# Patient Record
Sex: FEMALE | URBAN - NONMETROPOLITAN AREA
[De-identification: names, ages, dates, MRNs, and addresses within clinical notes are randomized per-mention and may not be internally consistent; named-entity substitution may affect disease eponyms.]

---

## 2019-11-22 ENCOUNTER — TELEPHONE (OUTPATIENT)
Dept: SURGERY | Facility: CLINIC | Age: 29
End: 2019-11-22

## 2023-05-11 ENCOUNTER — OFFICE VISIT (OUTPATIENT)
Dept: PSYCHIATRY | Facility: CLINIC | Age: 33
End: 2023-05-11
Payer: MEDICAID

## 2023-05-11 VITALS
HEIGHT: 66 IN | BODY MASS INDEX: 29.25 KG/M2 | DIASTOLIC BLOOD PRESSURE: 82 MMHG | WEIGHT: 182 LBS | HEART RATE: 77 BPM | SYSTOLIC BLOOD PRESSURE: 126 MMHG

## 2023-05-11 DIAGNOSIS — F41.1 GENERALIZED ANXIETY DISORDER: ICD-10-CM

## 2023-05-11 DIAGNOSIS — F33.1 MODERATE EPISODE OF RECURRENT MAJOR DEPRESSIVE DISORDER: Primary | ICD-10-CM

## 2023-05-11 DIAGNOSIS — Z13.39 ADHD (ATTENTION DEFICIT HYPERACTIVITY DISORDER) EVALUATION: ICD-10-CM

## 2023-05-11 RX ORDER — BUPROPION HYDROCHLORIDE 100 MG/1
100 TABLET, EXTENDED RELEASE ORAL 2 TIMES DAILY
Qty: 60 TABLET | Refills: 2 | Status: SHIPPED | OUTPATIENT
Start: 2023-05-11

## 2023-05-11 NOTE — PROGRESS NOTES
"Subjective   Marimar Shahid is a 32 y.o. female who presents today for initial evaluation     Chief Complaint:  anxiety    History of Present Illness:   History of Present Illness  Marimar Shahid presents to [facility] for initial evaluation.  Reports that she has been struggling with anxiety/feeling overwhelmed.  Does not feel that she experiences anxiety daily, but does feel constantly overwhelmed every day. Reports symptoms of anxiety that include worry, sometimes on edge and fidgety. She does say that anxiety occurs more at night, often resulting in increased snacking. Says that she is unable to actually rate anxiety as it is not consistent, but rates feeling overwhelmed as 9 out of 10 on a scale of 0-10 with 10 being the worst. Says that she feels that the smallest task can be overwhelming. Manages to do the daily tasks such as wash dishes, but finds organizing her home, doing laundry and other mundane tasks difficult to even start.  Says that she does not feel \"depressed or sad,\" but does admit to feeling disappointed in herself as she cannot complete daily tasks.  Says that she wants to complete these things, but lacks motivation to get them done. Says that she feels that she often has potential, but feels stuck and does not use her time wisely.  Reports appetite is good overall, does admit to a 40 pound weight gain over the past 3 years.  She says that sleep often varies, typically obtains average 10 hours of sleep each night.  She does wake up during the night at times and never feels rested the next day. Tries to make a goal to get up early in the mornings to complete things, but typically goes back to bed.  Says that some days she will get up at 9 AM, other days she will sleep till 12 PM.  Denies any current SI/HI or AVH. PHQ-9 total score: 18, ELIDA-7 total score: 16.    Endorses symptoms of ADHD including, but not limited to: careless mistakes or not " paying attention to directions or people of authority, trouble keeping attention on tasks and during hobbies or leisure activities, does not listen when spoken to directly, trouble organizing activities, avoids dislikes or doesn't want to do things that require mental effort for a long period of time, loses things needed for tasks, easily distracted, forgetful in daily activities, often fidgets with hands or feet or squirms in seat, is often on the go or often acts is if driven by a motor, often talks excessively, often blurts out answers before questions have been finished, often has trouble waiting one's turn and often interrupts or intrudes on others which have caused impairment in important areas of daily functioning.     Past Psychiatric History: Reports that she has struggled with anxiety for as long as she can remember.  Says that she feels that depression was possibly present in high school as she often missed school due to being ill.  Says that she was sick often, at one time developed palpitations and wore a heart monitor.  Says that cardiac issues were ruled out.  Says that now she feels most of her issues were anxiety related.  Denies any inpatient hospitalizations for mental health issues.  Denies any past or current self harming behaviors, SI/HI or AVH.    Previous Psych Meds: No previous psychiatric medications    Substance Use/Abuse: Denies any past or current substance use/abuse    Past Medical/Developmental History: Denies any significant medical history.  Denies any known developmental delays.    Family Psychiatric History: Mother with anxiety, father with depression and anxiety    Social History: Lives in [facility] with her boyfriend of 21 years.  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  She grew up in [place] with both parents until their divorce at age 8.  Has 1 older brother and 1 younger sister.  She says that although  parents , she visited her dad on the weekends. Says that she is close with both parents.  Says that she did well in school academically, often struggled with turning in assignments as she would procrastinate when completing homework.  After high school, she attended college and [employer] to obtain a bachelors in art with a major in ceramics.  Says that she seemed to lose interest and dropped out of college with only 4 classes remaining before obtaining a degree.     The following portions of the patient's history were reviewed and updated as appropriate: allergies, current medications, past family history, past medical history, past social history, past surgical history and problem list.      Past Medical History:  History reviewed. No pertinent past medical history.    Social History:  Social History     Socioeconomic History   • Marital status: Single   Tobacco Use   • Smoking status: Never   Substance and Sexual Activity   • Alcohol use: Yes     Alcohol/week: 4.0 standard drinks     Types: 4 Glasses of wine per week   • Drug use: Never   • Sexual activity: Defer       Family History:  Family History   Problem Relation Age of Onset   • Anxiety disorder Mother    • Anxiety disorder Father    • Depression Sister    • Anxiety disorder Sister    • Depression Brother    • Anxiety disorder Brother        Past Surgical History:  History reviewed. No pertinent surgical history.    Problem List:  There is no problem list on file for this patient.      Allergy:   No Known Allergies     Current Medications:   Current Outpatient Medications   Medication Sig Dispense Refill   • buPROPion SR (Wellbutrin SR) 100 MG 12 hr tablet Take 1 tablet by mouth 2 (Two) Times a Day. 60 tablet 2     No current facility-administered medications for this visit.       Review of Symptoms:    Review of Systems   Constitutional: Negative for activity change, appetite change and unexpected weight loss.   Respiratory: Negative for shortness of  "breath.    Cardiovascular: Negative for chest pain.   Psychiatric/Behavioral: Positive for decreased concentration and dysphoric mood. Negative for sleep disturbance and suicidal ideas. The patient is nervous/anxious.      Physical Exam:   Physical Exam  Vitals reviewed.   Constitutional:       General: She is not in acute distress.     Appearance: Normal appearance.   Neurological:      Mental Status: She is alert.     Vitals:   Blood pressure 126/82, pulse 77, height 166.4 cm (65.5\"), weight 82.6 kg (182 lb).    Mental Status Exam:   Hygiene:   good  Cooperation:  Cooperative  Eye Contact:  Good  Psychomotor Behavior:  Appropriate  Affect:  Appropriate  Mood: normal  Hopelessness: Denies  Speech:  Normal  Thought Process:  Goal directed and Linear  Thought Content:  Mood congruent  Suicidal:  None  Homicidal:  None  Hallucinations:  None  Delusion:  None  Memory:  Intact  Orientation:  Person, Place, Time and Situation  Reliability:  good  Insight:  Good  Judgement:  Good  Impulse Control:  Good    Lab Results:   No visits with results within 6 Month(s) from this visit.   Latest known visit with results is:   No results found for any previous visit.       EKG Results:  No orders to display       Assessment & Plan   Problems Addressed this Visit    None  Visit Diagnoses     Moderate episode of recurrent major depressive disorder    -  Primary    Relevant Medications    buPROPion SR (Wellbutrin SR) 100 MG 12 hr tablet    Generalized anxiety disorder        Relevant Medications    buPROPion SR (Wellbutrin SR) 100 MG 12 hr tablet    ADHD (attention deficit hyperactivity disorder) evaluation          Diagnoses       Codes Comments    Moderate episode of recurrent major depressive disorder    -  Primary ICD-10-CM: F33.1  ICD-9-CM: 296.32     Generalized anxiety disorder     ICD-10-CM: F41.1  ICD-9-CM: 300.02     ADHD (attention deficit hyperactivity disorder) evaluation     ICD-10-CM: Z13.39  ICD-9-CM: V79.8     "       Visit Diagnoses:    ICD-10-CM ICD-9-CM   1. Moderate episode of recurrent major depressive disorder  F33.1 296.32   2. Generalized anxiety disorder  F41.1 300.02   3. ADHD (attention deficit hyperactivity disorder) evaluation  Z13.39 V79.8       Marimar presents today for initial evaluation.  Complains of anxiety and feels that she may struggle with symptoms that strongly aligned with ADHD.  Denies feeling sad or depressed, but does struggle with lack of motivation and decreased energy.  PHQ score of 18 indicating moderately severe depression, ELIDA-7 score of 16 indicating severe anxiety. She also has symptoms that strongly aligned with ADHD and meets DSM-V criteria of ADHD.  Also has positive ASRS.  Discussed plan of care and medication options.  She is agreeable to schedule CPT to further assess ADHD symptoms.  Will start Wellbutrin SR twice daily to help with improvement in overall motivation.    -Start Wellbutrin  mg twice daily    -Reviewed previous available documentation and most recent available labs.   [place] reviewed and is appropriate.     -The benefits of a healthy diet and exercise were discussed with patient, especially the positive effects they have on mental health. Patient encouraged to consider lifestyle modification regarding diet and exercise patterns to maximize results of mental health treatment.     Encouraged patient to practice good sleep hygiene.  Discussed going to bed at the same time and getting up at the same time every day. Consider a quiet activity, such as reading, part of your nighttime routine. Make your bedroom a dark, comfortable place where it is easy to fall asleep. Avoid or limit caffeine consumption. Limit screen use, especially two hours prior to bed (this includes watching TV, using smartphone, tablet or computer).     GOALS:  Short Term Goals: Patient will be compliant with medication, and patient will have no significant medication related side effects.  Patient  will be engaged in psychotherapy as indicated.  Patient will report subjective improvement of symptoms.  Long term goals: To stabilize mood and treat/improve subjective symptoms, the patient will stay out of the hospital, the patient will be at an optimal level of functioning, and the patient will take all medications as prescribed.  The patient/guardian verbalized understanding and agreement with goals that were mutually set.    TREATMENT PLAN: Continue supportive psychotherapy efforts and medications as indicated for patient's diagnosis.  Pharmacological and Non-Pharmacological treatment options discussed during today's visit. Patient/Guardian acknowledged and verbally consented with current treatment plan and was educated on the importance of compliance with treatment and follow-up appointments.      MEDICATION ISSUES:  Discussed medication options and treatment plan of prescribed medication as well as the risks, benefits, any black box warnings, and side effects including potential falls, possible impaired driving, and metabolic adversities among others. Patient is agreeable to call the office with any worsening of symptoms or onset of side effects, or if any concerns or questions arise.  The contact information for the office is made available to the patient. Patient is agreeable to call 911 or go to the nearest ER should they begin having any SI/HI, or if any urgent concerns arise. No medication side effects or related complaints today.     MEDS ORDERED DURING VISIT:  New Medications Ordered This Visit   Medications   • buPROPion SR (Wellbutrin SR) 100 MG 12 hr tablet     Sig: Take 1 tablet by mouth 2 (Two) Times a Day.     Dispense:  60 tablet     Refill:  2       FOLLOW UP:  Return in about 4 weeks (around 6/8/2023) for Recheck.             This document has been electronically signed by RITCHIE Unger  May 11, 2023 12:42 EDT    Please note that portions of this note were completed with a voice  recognition program. Efforts were made to edit dictation, but occasionally words are mistranscribed.

## 2023-05-15 ENCOUNTER — TELEPHONE (OUTPATIENT)
Dept: PSYCHIATRY | Facility: CLINIC | Age: 33
End: 2023-05-15
Payer: MEDICAID

## 2023-05-15 NOTE — TELEPHONE ENCOUNTER
Please let her know that none of her T scores were atypical.  Overall the results do not suggest that Marimar has a disorder characterized by attention deficits, such as ADHD.  We can further discuss these results and symptoms at her follow-up.

## 2023-05-15 NOTE — TELEPHONE ENCOUNTER
Called and read summary of CPT to Marimar. Informed that I could not make a diagnosis and that she would have to wait until her appointment with you to go over everything in detail.

## 2023-05-15 NOTE — TELEPHONE ENCOUNTER
Marimar has been calling asking about her CPT results. She would like to know if someone can call her and let her know what they were.

## 2023-06-09 ENCOUNTER — OFFICE VISIT (OUTPATIENT)
Dept: PSYCHIATRY | Facility: CLINIC | Age: 33
End: 2023-06-09
Payer: MEDICAID

## 2023-06-09 VITALS
HEIGHT: 66 IN | WEIGHT: 182 LBS | BODY MASS INDEX: 29.25 KG/M2 | HEART RATE: 133 BPM | SYSTOLIC BLOOD PRESSURE: 130 MMHG | DIASTOLIC BLOOD PRESSURE: 100 MMHG

## 2023-06-09 DIAGNOSIS — F33.1 MODERATE EPISODE OF RECURRENT MAJOR DEPRESSIVE DISORDER: Primary | ICD-10-CM

## 2023-06-09 DIAGNOSIS — Z79.899 MEDICATION MANAGEMENT: ICD-10-CM

## 2023-06-09 DIAGNOSIS — F41.1 GENERALIZED ANXIETY DISORDER: ICD-10-CM

## 2023-06-19 ENCOUNTER — TELEPHONE (OUTPATIENT)
Dept: PSYCHIATRY | Facility: CLINIC | Age: 33
End: 2023-06-19
Payer: MEDICAID

## 2023-06-19 DIAGNOSIS — Z79.899 MEDICATION MANAGEMENT: Primary | ICD-10-CM

## 2023-06-19 DIAGNOSIS — Z13.39 ADHD (ATTENTION DEFICIT HYPERACTIVITY DISORDER) EVALUATION: ICD-10-CM

## 2023-06-19 NOTE — TELEPHONE ENCOUNTER
Patient called in states Kalie had told her to get a EKG and the order has  can you review I do not see a expiration date on order.

## 2023-06-19 NOTE — TELEPHONE ENCOUNTER
The order is in Epic. It was placed on 2023 and does not  until 2024. She will just need to present to the hospital for it.

## 2023-08-15 ENCOUNTER — OFFICE VISIT (OUTPATIENT)
Dept: PSYCHIATRY | Facility: CLINIC | Age: 33
End: 2023-08-15
Payer: MEDICAID

## 2023-08-15 VITALS
HEIGHT: 66 IN | BODY MASS INDEX: 27.97 KG/M2 | DIASTOLIC BLOOD PRESSURE: 90 MMHG | HEART RATE: 92 BPM | WEIGHT: 174 LBS | SYSTOLIC BLOOD PRESSURE: 152 MMHG

## 2023-08-15 DIAGNOSIS — F41.9 ANXIETY: ICD-10-CM

## 2023-08-15 DIAGNOSIS — F90.2 ADHD (ATTENTION DEFICIT HYPERACTIVITY DISORDER), COMBINED TYPE: Primary | ICD-10-CM

## 2023-08-15 RX ORDER — DEXTROAMPHETAMINE SACCHARATE, AMPHETAMINE ASPARTATE MONOHYDRATE, DEXTROAMPHETAMINE SULFATE AND AMPHETAMINE SULFATE 5; 5; 5; 5 MG/1; MG/1; MG/1; MG/1
20 CAPSULE, EXTENDED RELEASE ORAL EVERY MORNING
Qty: 30 CAPSULE | Refills: 0 | Status: SHIPPED | OUTPATIENT
Start: 2023-08-15

## 2023-08-15 RX ORDER — PROPRANOLOL HYDROCHLORIDE 10 MG/1
10-20 TABLET ORAL 2 TIMES DAILY PRN
Qty: 120 TABLET | Refills: 0 | Status: SHIPPED | OUTPATIENT
Start: 2023-08-15

## 2023-08-15 NOTE — PROGRESS NOTES
Subjective   Marimar Shahid is a 33 y.o. female who presents today for follow up    Chief Complaint: ADHD    History of Present Illness:   History of Present Illness  Marimar Shahid presents today for medication management follow-up.  Currently taking Adderall 10 mg twice daily. Reports only minimal relief of ADHD symptoms with medication. Feels that concentration has improved only slightly and is able to complete tasks without becoming as distracted.  She does continue to struggle with ADHD symptoms that include avoiding tasks that require mental effort, forgetfulness, losing items, fidgetiness and concentrating during conversations.  Rates ADHD symptoms before medication 9/10 and after medication 7-7.5/10 on a 0-10 scale with 10 being the worst. Says that she has been monitoring BP as discussed last visit and brought BP log for review today. Reports situational anxiety and intermittent depressive type symptoms that she feels is related to ADHD symptoms. Denies any adverse effects associated with medication. Denies any CP/SOA or palpitations. Reports that she is sleeping well and appetite is good.  Denies any SI/HI.  PHQ-9 total score: 3 (previously 12), ELIDA-7 total score: 4 (previously 15).    Previous Psych Meds: Wellbutrin SR (not effective), Adderall IR     The following portions of the patient's history were reviewed and updated as appropriate: allergies, current medications, past family history, past medical history, past social history, past surgical history and problem list.      Past Medical History:  History reviewed. No pertinent past medical history.    Social History:  Social History     Socioeconomic History    Marital status: Single   Tobacco Use    Smoking status: Never     Passive exposure: Never    Smokeless tobacco: Never   Vaping Use    Vaping Use: Never used   Substance and Sexual Activity    Alcohol use: Yes     Alcohol/week: 4.0 standard drinks     Types: 4 Glasses of wine per week     "Drug use: Never    Sexual activity: Defer       Family History:  Family History   Problem Relation Age of Onset    Anxiety disorder Mother     Anxiety disorder Father     Depression Sister     Anxiety disorder Sister     Depression Brother     Anxiety disorder Brother        Past Surgical History:  History reviewed. No pertinent surgical history.    Problem List:  There is no problem list on file for this patient.      Allergy:   No Known Allergies     Current Medications:   Current Outpatient Medications   Medication Sig Dispense Refill    amphetamine-dextroamphetamine XR (Adderall XR) 20 MG 24 hr capsule Take 1 capsule by mouth Every Morning 30 capsule 0    propranolol (INDERAL) 10 MG tablet Take 1-2 tablets by mouth 2 (Two) Times a Day As Needed (anxiety). 120 tablet 0     No current facility-administered medications for this visit.     Review of Systems   Constitutional:  Negative for activity change, appetite change, unexpected weight gain and unexpected weight loss.   Respiratory:  Negative for shortness of breath.    Cardiovascular:  Negative for chest pain and palpitations.   Psychiatric/Behavioral:  Positive for decreased concentration and dysphoric mood. Negative for sleep disturbance and suicidal ideas. The patient is nervous/anxious.      Physical Exam  Vitals reviewed.   Constitutional:       General: She is not in acute distress.     Appearance: Normal appearance.   Neurological:      Mental Status: She is alert.      Gait: Gait normal.     Vitals:   Blood pressure 152/90, pulse 92, height 166.4 cm (65.5\"), weight 78.9 kg (174 lb).    Mental Status Exam:   Hygiene:   good  Cooperation:  Cooperative  Eye Contact:  Good  Psychomotor Behavior:  Appropriate  Affect:  Appropriate  Mood: normal  Hopelessness: Denies  Speech:  Normal  Thought Process:  Goal directed and Linear  Thought Content:  Mood congruent  Suicidal:  None  Homicidal:  None  Hallucinations:  None  Delusion:  None  Memory:  " Intact  Orientation:  Person, Place, Time, and Situation  Reliability:  good  Insight:  Good  Judgement:  Good  Impulse Control:  Good    Lab Results:   Hospital Outpatient Visit on 06/19/2023   Component Date Value Ref Range Status    QT Interval 06/19/2023 382  ms Final    QTC Interval 06/19/2023 446  ms Final   Lab on 06/19/2023   Component Date Value Ref Range Status    THC, Screen, Urine 06/19/2023 Negative  Negative Final    Phencyclidine (PCP), Urine 06/19/2023 Negative  Negative Final    Cocaine Screen, Urine 06/19/2023 Negative  Negative Final    Methamphetamine, Ur 06/19/2023 Negative  Negative Final    Opiate Screen 06/19/2023 Negative  Negative Final    Amphetamine Screen, Urine 06/19/2023 Negative  Negative Final    Benzodiazepine Screen, Urine 06/19/2023 Negative  Negative Final    Tricyclic Antidepressants Screen 06/19/2023 Negative  Negative Final    Methadone Screen, Urine 06/19/2023 Negative  Negative Final    Barbiturates Screen, Urine 06/19/2023 Negative  Negative Final    Oxycodone Screen, Urine 06/19/2023 Negative  Negative Final    Propoxyphene Screen 06/19/2023 Negative  Negative Final    Buprenorphine, Screen, Urine 06/19/2023 Negative  Negative Final    PH 06/19/2023 7.9  4.5 - 9 Final    CREATININE 06/19/2023 16.1  20 - 300 mg/dL mg/dL Final    SPECIFIC GRAVITY 06/19/2023 1.001  1.003 - 1.035 Final    CODEINE 06/19/2023 Negative  50 ng/ml ng/ml Final    HYDROCODONE 06/19/2023 Negative  50 ng/ml ng/ml Final    NORHYDROCODONE 06/19/2023 Negative  50 ng/ml ng/ml Final    HYDROMORPHONE 06/19/2023 Negative  50 ng/ml ng/ml Final    MORPHINE 06/19/2023 Negative  50 ng/ml ng/ml Final    FENTANYL 06/19/2023 Negative  2 ng/ml ng/ml Final    NORFENTANYL 06/19/2023 Negative  10 ng/ml ng/ml Final    METHADONE 06/19/2023 Negative  25 ng/ml ng/ml Final    EDDP 06/19/2023 Negative  50 ng/ml ng/ml Final    OXYCODONE 06/19/2023 Negative  50 ng/ml ng/ml Final    NOROXYCODONE 06/19/2023 Negative  50 ng/ml  ng/ml Final    OXYMORPHONE 06/19/2023 Negative  50 ng/ml ng/ml Final    TAPENTADOL 06/19/2023 Negative  50 ng/ml ng/ml Final    TRAMADOL 06/19/2023 Negative  50 ng/ml ng/ml Final    BUPRENORPHINE 06/19/2023 Negative  7.5 ng/ml ng/ml Final    NORBUPRENORPHINE 06/19/2023 Negative  10 ng/ml ng/ml Final    AMOBARBITAL 06/19/2023 Negative  100 ng/ml ng/ml Final    BUTABARBITAL 06/19/2023 Negative  100 ng/ml ng/ml Final    BUTALBITAL 06/19/2023 Negative  100 ng/ml ng/ml Final    PHENOBARBITAL 06/19/2023 Negative  100 ng/ml ng/ml Final    SECOBARBITAL 06/19/2023 Negative  100 ng/ml ng/ml Final    ALPRAZOLAM 06/19/2023 Negative  50 ng/ml ng/ml Final    ALPHA-HYDROXYALPRAZOLAM 06/19/2023 Negative  50 ng/ml ng/ml Final    CLONAZEPAM 06/19/2023 Negative  50 ng/ml ng/ml Final    7- AMINOCLONAZEPAM 06/19/2023 Negative  50 ng/ml ng/ml Final    DIAZEPAM 06/19/2023 Negative  50 ng/ml ng/ml Final    NORDIAZEPAM 06/19/2023 Negative  50 ng/ml ng/ml Final    LORAZEPAM 06/19/2023 Negative  100 ng/ml ng/ml Final    MIDAZOLAM 06/19/2023 Negative  50 ng/ml ng/ml Final    ALPHA-HYDROXYMIDAZOLAM 06/19/2023 Negative  50 ng/ml ng/ml Final    OXAZEPAM 06/19/2023 Negative  50 ng/ml ng/ml Final    TEMAZEPAM 06/19/2023 Negative  50 ng/ml ng/ml Final    ETG 06/19/2023 1162.2 (C)  200 ng/ml ng/ml Final    BENZOYLECGONINE 06/19/2023 Negative  100 ng/ml ng/ml Final    6-CHARIS 06/19/2023 Negative  10 ng/ml ng/ml Final    MDMA 06/19/2023 Negative  100 ng/ml ng/ml Final    PCP 06/19/2023 Negative  20 ng/ml ng/ml Final    DELTA-9-THC 06/19/2023 Negative  20 ng/ml ng/ml Final    AMPHETAMINE 06/19/2023 Negative  250 ng/ml ng/ml Final    METHAMPHETAMINE 06/19/2023 Negative  100 ng/ml ng/ml Final    METHYLPHENIDATE 06/19/2023 Negative  10 ng/ml ng/ml Final    PHENTERMINE 06/19/2023 Negative  100 ng/ml ng/ml Final    RITALINIC ACID 06/19/2023 Negative  50 ng/ml ng/ml Final    CARISOPRODOL 06/19/2023 Negative  100 ng/ml ng/ml Final    GABAPENTIN 06/19/2023  Negative  500 ng/ml ng/ml Final    PREGABALIN 06/19/2023 Negative  250 ng/ml ng/ml Final    ZOLPIDEM 06/19/2023 Negative  2 ng/ml ng/ml Final    CARBOXYZOLPIDEM 06/19/2023 Negative  10 ng/ml ng/ml Final       EKG Results:  No orders to display       Assessment & Plan   Problems Addressed this Visit    None  Visit Diagnoses       ADHD (attention deficit hyperactivity disorder), combined type    -  Primary    Relevant Medications    amphetamine-dextroamphetamine XR (Adderall XR) 20 MG 24 hr capsule    Anxiety        Relevant Medications    propranolol (INDERAL) 10 MG tablet          Diagnoses         Codes Comments    ADHD (attention deficit hyperactivity disorder), combined type    -  Primary ICD-10-CM: F90.2  ICD-9-CM: 314.01     Anxiety     ICD-10-CM: F41.9  ICD-9-CM: 300.00             Visit Diagnoses:    ICD-10-CM ICD-9-CM   1. ADHD (attention deficit hyperactivity disorder), combined type  F90.2 314.01   2. Anxiety  F41.9 300.00     Marimar presents today for medication management follow-up.  Was recently started on Adderall IR 10 mg twice daily for ADHD symptoms and to determine tolerability with plan to switch medication to extended release formulation. Marimar reports that she has done well with medication and denies any adverse effects. She does say that only minimal relief in ADHD symptoms has been provided with medication.  Has monitored BP at home as discussed last visit and has log for review today. SBP ranging from 112-123 and DBP ranging 68-84.  Continues to struggle with situational anxiety and becoming anxious when overwhelmed.  Increased anxiety possibly related to uncontrolled ADHD symptoms. PHQ-9 score of 3 indicating minimal depression, ELIDA-7 score of 4 indicating minimal anxiety. Discussed plan, agreeable to stop Adderall IR 10 mg twice daily and start Adderall XR 20 mg daily. Will also start propranolol 10 to 20 mg twice daily as needed for anxiety. Encouraged her to continue with monitoring BP at  home.    -Stop Adderall IR 10 mg twice daily for ADHD  -Start Adderall XR 20 mg daily for ADHD  -Start propranolol 10 to 20 mg twice daily as needed for anxiety    -Reviewed previous available documentation and most recent available labs. EKG obtained 6/19/2023, NSR with HR 82. UDS on file from 6/19/2023 and is appropriate. [place] reviewed and is appropriate. Signed controlled substance agreement on file. Counseled on use of controlled substances.     The patient is being prescribed a controlled substance as part of the treatment plan. The patient/guardian has been educated of appropriate use of the medication(s), including risks and side effects such as insomnia, headache, exacerbation of tics, nervousness, irritability, overstimulation, tremor, dizziness, anorexia or change in appetite, nausea, dry mouth, constipation, diarrhea, weight loss, sexual dysfunction, psychotic episodes, seizures, palpitations, tachycardia, hypertension, rare activation (activation of hypomania, adrianne, and/or suicidal ideations), cardiovascular adverse effects including sudden death (especially patients with pre-existing structural abnormalities often associated with a family history of cardiac disease), may slow normal growth in children, weight gain is reported but not expected, sedation is possible but activation is much more common, metabolic adversities, and overdose among others. Patient/guardian is also informed that the medication is to be used by the patient only, the medication is to be used only as directed, and the medication should not be combined with other substances unless directed by a Provider/Prescriber. The patient/guardian verbalized understanding and agreement with this in their own words and wish to continue with current treatment plan.     GOALS:  Short Term Goals: Patient will be compliant with medication, and patient will have no significant medication related side effects.  Patient will be engaged in  psychotherapy as indicated.  Patient will report subjective improvement of symptoms.  Long term goals: To stabilize mood and treat/improve subjective symptoms, the patient will stay out of the hospital, the patient will be at an optimal level of functioning, and the patient will take all medications as prescribed.  The patient/guardian verbalized understanding and agreement with goals that were mutually set.    TREATMENT PLAN: Continue supportive psychotherapy efforts and medications as indicated for patient's diagnosis.  Pharmacological and Non-Pharmacological treatment options discussed during today's visit. Patient/Guardian acknowledged and verbally consented with current treatment plan and was educated on the importance of compliance with treatment and follow-up appointments.      MEDICATION ISSUES:  Discussed medication options and treatment plan of prescribed medication as well as the risks, benefits, any black box warnings, and side effects including potential falls, possible impaired driving, and metabolic adversities among others. Patient is agreeable to call the office with any worsening of symptoms or onset of side effects, or if any concerns or questions arise.  The contact information for the office is made available to the patient. Patient is agreeable to call 911 or go to the nearest ER should they begin having any SI/HI, or if any urgent concerns arise. No medication side effects or related complaints today.     MEDS ORDERED DURING VISIT:  New Medications Ordered This Visit   Medications    amphetamine-dextroamphetamine XR (Adderall XR) 20 MG 24 hr capsule     Sig: Take 1 capsule by mouth Every Morning     Dispense:  30 capsule     Refill:  0    propranolol (INDERAL) 10 MG tablet     Sig: Take 1-2 tablets by mouth 2 (Two) Times a Day As Needed (anxiety).     Dispense:  120 tablet     Refill:  0       FOLLOW UP:  Return in about 4 weeks (around 9/12/2023) for Recheck.            This document has been  electronically signed by RITCHIE Unger  August 22, 2023 14:19 EDT    Please note that portions of this note were completed with a voice recognition program. Efforts were made to edit dictation, but occasionally words are mistranscribed.

## 2023-09-14 ENCOUNTER — OFFICE VISIT (OUTPATIENT)
Dept: PSYCHIATRY | Facility: CLINIC | Age: 33
End: 2023-09-14
Payer: MEDICAID

## 2023-09-14 VITALS
DIASTOLIC BLOOD PRESSURE: 98 MMHG | OXYGEN SATURATION: 99 % | HEIGHT: 66 IN | BODY MASS INDEX: 27.97 KG/M2 | WEIGHT: 174 LBS | HEART RATE: 94 BPM | SYSTOLIC BLOOD PRESSURE: 148 MMHG

## 2023-09-14 DIAGNOSIS — F41.9 ANXIETY: ICD-10-CM

## 2023-09-14 DIAGNOSIS — F90.2 ADHD (ATTENTION DEFICIT HYPERACTIVITY DISORDER), COMBINED TYPE: Primary | ICD-10-CM

## 2023-09-14 RX ORDER — DEXTROAMPHETAMINE SACCHARATE, AMPHETAMINE ASPARTATE, DEXTROAMPHETAMINE SULFATE AND AMPHETAMINE SULFATE 2.5; 2.5; 2.5; 2.5 MG/1; MG/1; MG/1; MG/1
10 TABLET ORAL DAILY
Qty: 30 TABLET | Refills: 0 | Status: SHIPPED | OUTPATIENT
Start: 2023-09-14

## 2023-09-14 RX ORDER — DEXTROAMPHETAMINE SACCHARATE, AMPHETAMINE ASPARTATE MONOHYDRATE, DEXTROAMPHETAMINE SULFATE AND AMPHETAMINE SULFATE 5; 5; 5; 5 MG/1; MG/1; MG/1; MG/1
20 CAPSULE, EXTENDED RELEASE ORAL EVERY MORNING
Qty: 30 CAPSULE | Refills: 0 | Status: SHIPPED | OUTPATIENT
Start: 2023-09-14

## 2023-09-14 NOTE — PROGRESS NOTES
Subjective   Marimar Shahid is a 33 y.o. female who presents today for follow up    Chief Complaint: ADHD    History of Present Illness:   History of Present Illness  Marimar Shahid presents today for medication management follow up. Currently taking Adderall XR 20 mg daily. Also has propranolol 10-20 mg twice daily as needed for anxiety. Verbalizes that she has not tried Propranolol yet as her mother and sister become sleepy with medication. Feels that ADHD symptoms are better controlled. Says that she is able to focus, stay on task without becoming distracted and improvement in initiating tasks. Says that medication does tend to wear off in late afternoon, lasting approximately 6.5 hours. Feels that symptoms return around 3 pm. Rates ADHD symptoms 5/10 (previously 7-7.5/10) on a 0-10 scale with 10 being worst. Anxiety remains constant with symptoms that include feeling anxious, nervous, on edge and worry. Symptoms associated with depression occur occasionally, but says that these symptoms are often triggered by elevated anxiety. Reports improvement in sleep, obtaining about 9 hours per night. Says that she is resting better, getting up earlier and not sleeping for long periods of time. Reports appetite is good. Has continued to monitor BP at home with readings ranging from 111/69 to 123/83. Denies any CP/SOA, or palpitations. Denies SI/HI.  PHQ-9 total score: 3, ELIDA-7 total score: 4.    Previous Psych Meds: Wellbutrin SR (not effective), Adderall IR     The following portions of the patient's history were reviewed and updated as appropriate: allergies, current medications, past family history, past medical history, past social history, past surgical history and problem list.      Past Medical History:  History reviewed. No pertinent past medical history.    Social History:  Social History     Socioeconomic History    Marital status: Single   Tobacco Use    Smoking status: Never     Passive exposure: Never  "   Smokeless tobacco: Never   Vaping Use    Vaping Use: Never used   Substance and Sexual Activity    Alcohol use: Yes     Alcohol/week: 4.0 standard drinks     Types: 4 Glasses of wine per week    Drug use: Never    Sexual activity: Defer       Family History:  Family History   Problem Relation Age of Onset    Anxiety disorder Mother     Anxiety disorder Father     Depression Sister     Anxiety disorder Sister     Depression Brother     Anxiety disorder Brother        Past Surgical History:  History reviewed. No pertinent surgical history.    Problem List:  There is no problem list on file for this patient.      Allergy:   No Known Allergies     Current Medications:   Current Outpatient Medications   Medication Sig Dispense Refill    amphetamine-dextroamphetamine XR (Adderall XR) 20 MG 24 hr capsule Take 1 capsule by mouth Every Morning 30 capsule 0    propranolol (INDERAL) 10 MG tablet Take 1-2 tablets by mouth 2 (Two) Times a Day As Needed (anxiety). 120 tablet 0    amphetamine-dextroamphetamine (Adderall) 10 MG tablet Take 1 tablet by mouth Daily. 30 tablet 0     No current facility-administered medications for this visit.     Review of Systems   Constitutional:  Negative for activity change, appetite change, unexpected weight gain and unexpected weight loss.   Respiratory:  Negative for shortness of breath.    Cardiovascular:  Negative for chest pain and palpitations.   Psychiatric/Behavioral:  Positive for decreased concentration and dysphoric mood. Negative for sleep disturbance and suicidal ideas. The patient is nervous/anxious.      Physical Exam  Vitals reviewed.   Constitutional:       General: She is not in acute distress.     Appearance: Normal appearance.   Neurological:      Mental Status: She is alert.      Gait: Gait normal.     Vitals:   Blood pressure 148/98, pulse 94, height 166.4 cm (65.5\"), weight 78.9 kg (174 lb), SpO2 99 %.    Mental Status Exam:   Hygiene:   good  Cooperation:  " Cooperative  Eye Contact:  Good  Psychomotor Behavior:  Appropriate  Affect:  Appropriate  Mood: normal  Hopelessness: Denies  Speech:  Normal  Thought Process:  Goal directed and Linear  Thought Content:  Mood congruent  Suicidal:  None  Homicidal:  None  Hallucinations:  None  Delusion:  None  Memory:  Intact  Orientation:  Person, Place, Time, and Situation  Reliability:  good  Insight:  Good  Judgement:  Good  Impulse Control:  Good    Lab Results:   Hospital Outpatient Visit on 06/19/2023   Component Date Value Ref Range Status    QT Interval 06/19/2023 382  ms Final    QTC Interval 06/19/2023 446  ms Final   Lab on 06/19/2023   Component Date Value Ref Range Status    THC, Screen, Urine 06/19/2023 Negative  Negative Final    Phencyclidine (PCP), Urine 06/19/2023 Negative  Negative Final    Cocaine Screen, Urine 06/19/2023 Negative  Negative Final    Methamphetamine, Ur 06/19/2023 Negative  Negative Final    Opiate Screen 06/19/2023 Negative  Negative Final    Amphetamine Screen, Urine 06/19/2023 Negative  Negative Final    Benzodiazepine Screen, Urine 06/19/2023 Negative  Negative Final    Tricyclic Antidepressants Screen 06/19/2023 Negative  Negative Final    Methadone Screen, Urine 06/19/2023 Negative  Negative Final    Barbiturates Screen, Urine 06/19/2023 Negative  Negative Final    Oxycodone Screen, Urine 06/19/2023 Negative  Negative Final    Propoxyphene Screen 06/19/2023 Negative  Negative Final    Buprenorphine, Screen, Urine 06/19/2023 Negative  Negative Final    PH 06/19/2023 7.9  4.5 - 9 Final    CREATININE 06/19/2023 16.1  20 - 300 mg/dL mg/dL Final    SPECIFIC GRAVITY 06/19/2023 1.001  1.003 - 1.035 Final    CODEINE 06/19/2023 Negative  50 ng/ml ng/ml Final    HYDROCODONE 06/19/2023 Negative  50 ng/ml ng/ml Final    NORHYDROCODONE 06/19/2023 Negative  50 ng/ml ng/ml Final    HYDROMORPHONE 06/19/2023 Negative  50 ng/ml ng/ml Final    MORPHINE 06/19/2023 Negative  50 ng/ml ng/ml Final    FENTANYL  06/19/2023 Negative  2 ng/ml ng/ml Final    NORFENTANYL 06/19/2023 Negative  10 ng/ml ng/ml Final    METHADONE 06/19/2023 Negative  25 ng/ml ng/ml Final    EDDP 06/19/2023 Negative  50 ng/ml ng/ml Final    OXYCODONE 06/19/2023 Negative  50 ng/ml ng/ml Final    NOROXYCODONE 06/19/2023 Negative  50 ng/ml ng/ml Final    OXYMORPHONE 06/19/2023 Negative  50 ng/ml ng/ml Final    TAPENTADOL 06/19/2023 Negative  50 ng/ml ng/ml Final    TRAMADOL 06/19/2023 Negative  50 ng/ml ng/ml Final    BUPRENORPHINE 06/19/2023 Negative  7.5 ng/ml ng/ml Final    NORBUPRENORPHINE 06/19/2023 Negative  10 ng/ml ng/ml Final    AMOBARBITAL 06/19/2023 Negative  100 ng/ml ng/ml Final    BUTABARBITAL 06/19/2023 Negative  100 ng/ml ng/ml Final    BUTALBITAL 06/19/2023 Negative  100 ng/ml ng/ml Final    PHENOBARBITAL 06/19/2023 Negative  100 ng/ml ng/ml Final    SECOBARBITAL 06/19/2023 Negative  100 ng/ml ng/ml Final    ALPRAZOLAM 06/19/2023 Negative  50 ng/ml ng/ml Final    ALPHA-HYDROXYALPRAZOLAM 06/19/2023 Negative  50 ng/ml ng/ml Final    CLONAZEPAM 06/19/2023 Negative  50 ng/ml ng/ml Final    7- AMINOCLONAZEPAM 06/19/2023 Negative  50 ng/ml ng/ml Final    DIAZEPAM 06/19/2023 Negative  50 ng/ml ng/ml Final    NORDIAZEPAM 06/19/2023 Negative  50 ng/ml ng/ml Final    LORAZEPAM 06/19/2023 Negative  100 ng/ml ng/ml Final    MIDAZOLAM 06/19/2023 Negative  50 ng/ml ng/ml Final    ALPHA-HYDROXYMIDAZOLAM 06/19/2023 Negative  50 ng/ml ng/ml Final    OXAZEPAM 06/19/2023 Negative  50 ng/ml ng/ml Final    TEMAZEPAM 06/19/2023 Negative  50 ng/ml ng/ml Final    ETG 06/19/2023 1162.2 (C)  200 ng/ml ng/ml Final    BENZOYLECGONINE 06/19/2023 Negative  100 ng/ml ng/ml Final    6-CHARIS 06/19/2023 Negative  10 ng/ml ng/ml Final    MDMA 06/19/2023 Negative  100 ng/ml ng/ml Final    PCP 06/19/2023 Negative  20 ng/ml ng/ml Final    DELTA-9-THC 06/19/2023 Negative  20 ng/ml ng/ml Final    AMPHETAMINE 06/19/2023 Negative  250 ng/ml ng/ml Final    METHAMPHETAMINE  06/19/2023 Negative  100 ng/ml ng/ml Final    METHYLPHENIDATE 06/19/2023 Negative  10 ng/ml ng/ml Final    PHENTERMINE 06/19/2023 Negative  100 ng/ml ng/ml Final    RITALINIC ACID 06/19/2023 Negative  50 ng/ml ng/ml Final    CARISOPRODOL 06/19/2023 Negative  100 ng/ml ng/ml Final    GABAPENTIN 06/19/2023 Negative  500 ng/ml ng/ml Final    PREGABALIN 06/19/2023 Negative  250 ng/ml ng/ml Final    ZOLPIDEM 06/19/2023 Negative  2 ng/ml ng/ml Final    CARBOXYZOLPIDEM 06/19/2023 Negative  10 ng/ml ng/ml Final       EKG Results:  No orders to display       Assessment & Plan   Problems Addressed this Visit    None  Visit Diagnoses       ADHD (attention deficit hyperactivity disorder), combined type    -  Primary    Relevant Medications    amphetamine-dextroamphetamine (Adderall) 10 MG tablet    amphetamine-dextroamphetamine XR (Adderall XR) 20 MG 24 hr capsule    Anxiety              Diagnoses         Codes Comments    ADHD (attention deficit hyperactivity disorder), combined type    -  Primary ICD-10-CM: F90.2  ICD-9-CM: 314.01     Anxiety     ICD-10-CM: F41.9  ICD-9-CM: 300.00             Visit Diagnoses:    ICD-10-CM ICD-9-CM   1. ADHD (attention deficit hyperactivity disorder), combined type  F90.2 314.01   2. Anxiety  F41.9 300.00     Marimar presents today for medication management follow up. She continues to notice improvement in ADHD symptoms. Feels that current dose of Adderall XR works well, but does feel that medication wears off after about 6.5 hours. Continues to struggle with anxiety, but feels that symptoms are at her baseline. Denies depression, but does feel occasional symptoms of depression that she feels is likely related to anxiety. Discussed plan and medication regimen. Agreeable to continue with Adderall XR 20 mg and will start Adderall IR 10 mg in afternoon to help with symptom control later in the day/evening. Has continued to monitor BP at home with SBP ranging 111-123 and DBP ranging 69-83. She is  agreeable to continue with monitoring BP and keeping log for follow up visit. Has not been utilizing propranolol for anxiety, but voices that she will try medication when anxiety level is elevated. Verbalizes that she does experience some dry mouth with medication, denies any other adverse effects of current medication regimen.     -Start Adderall IR 10 mg daily for ADHD  -Refill Adderall XR 20 mg daily for ADHD  -Continue propranolol 10 to 20 mg twice daily as needed for anxiety    -Reviewed previous available documentation and most recent available labs. EKG obtained 6/19/2023, NSR with HR 82. UDS on file from 6/19/2023 and is appropriate. [place] reviewed per PDMP and is appropriate. Signed controlled substance agreement on file. Counseled on use of controlled substances.     The patient is being prescribed a controlled substance as part of the treatment plan. The patient/guardian has been educated of appropriate use of the medication(s), including risks and side effects such as insomnia, headache, exacerbation of tics, nervousness, irritability, overstimulation, tremor, dizziness, anorexia or change in appetite, nausea, dry mouth, constipation, diarrhea, weight loss, sexual dysfunction, psychotic episodes, seizures, palpitations, tachycardia, hypertension, rare activation (activation of hypomania, adrianne, and/or suicidal ideations), cardiovascular adverse effects including sudden death (especially patients with pre-existing structural abnormalities often associated with a family history of cardiac disease), may slow normal growth in children, weight gain is reported but not expected, sedation is possible but activation is much more common, metabolic adversities, and overdose among others. Patient/guardian is also informed that the medication is to be used by the patient only, the medication is to be used only as directed, and the medication should not be combined with other substances unless directed by a  Provider/Prescriber. The patient/guardian verbalized understanding and agreement with this in their own words and wish to continue with current treatment plan.     GOALS:  Short Term Goals: Patient will be compliant with medication, and patient will have no significant medication related side effects.  Patient will be engaged in psychotherapy as indicated.  Patient will report subjective improvement of symptoms.  Long term goals: To stabilize mood and treat/improve subjective symptoms, the patient will stay out of the hospital, the patient will be at an optimal level of functioning, and the patient will take all medications as prescribed.  The patient/guardian verbalized understanding and agreement with goals that were mutually set.    TREATMENT PLAN: Continue supportive psychotherapy efforts and medications as indicated for patient's diagnosis.  Pharmacological and Non-Pharmacological treatment options discussed during today's visit. Patient/Guardian acknowledged and verbally consented with current treatment plan and was educated on the importance of compliance with treatment and follow-up appointments.      MEDICATION ISSUES:  Discussed medication options and treatment plan of prescribed medication as well as the risks, benefits, any black box warnings, and side effects including potential falls, possible impaired driving, and metabolic adversities among others. Patient is agreeable to call the office with any worsening of symptoms or onset of side effects, or if any concerns or questions arise.  The contact information for the office is made available to the patient. Patient is agreeable to call 911 or go to the nearest ER should they begin having any SI/HI, or if any urgent concerns arise. No medication side effects or related complaints today.     MEDS ORDERED DURING VISIT:  New Medications Ordered This Visit   Medications    amphetamine-dextroamphetamine (Adderall) 10 MG tablet     Sig: Take 1 tablet by mouth  Daily.     Dispense:  30 tablet     Refill:  0    amphetamine-dextroamphetamine XR (Adderall XR) 20 MG 24 hr capsule     Sig: Take 1 capsule by mouth Every Morning     Dispense:  30 capsule     Refill:  0       FOLLOW UP:  Return in about 4 weeks (around 10/12/2023) for Recheck.    I spent 30 minutes caring for Marimar on this date of service. This time includes time spent by me in the following activities: preparing for the visit, obtaining and/or reviewing a separately obtained history, performing a medically appropriate examination and/or evaluation, counseling and educating the patient/family/caregiver, ordering medications, tests, or procedures and documenting information in the medical record.           This document has been electronically signed by RITCHIE Unger  September 14, 2023 14:00 EDT    Please note that portions of this note were completed with a voice recognition program. Efforts were made to edit dictation, but occasionally words are mistranscribed.

## 2023-10-24 DIAGNOSIS — F41.9 ANXIETY: ICD-10-CM

## 2023-10-24 DIAGNOSIS — F90.2 ADHD (ATTENTION DEFICIT HYPERACTIVITY DISORDER), COMBINED TYPE: ICD-10-CM

## 2023-10-24 RX ORDER — PROPRANOLOL HYDROCHLORIDE 20 MG/1
10-20 TABLET ORAL 2 TIMES DAILY PRN
Qty: 60 TABLET | Refills: 2 | Status: SHIPPED | OUTPATIENT
Start: 2023-10-24

## 2023-10-24 RX ORDER — DEXTROAMPHETAMINE SACCHARATE, AMPHETAMINE ASPARTATE, DEXTROAMPHETAMINE SULFATE AND AMPHETAMINE SULFATE 2.5; 2.5; 2.5; 2.5 MG/1; MG/1; MG/1; MG/1
10 TABLET ORAL DAILY
Qty: 30 TABLET | Refills: 0 | Status: SHIPPED | OUTPATIENT
Start: 2023-10-24

## 2023-10-24 RX ORDER — DEXTROAMPHETAMINE SACCHARATE, AMPHETAMINE ASPARTATE MONOHYDRATE, DEXTROAMPHETAMINE SULFATE AND AMPHETAMINE SULFATE 5; 5; 5; 5 MG/1; MG/1; MG/1; MG/1
20 CAPSULE, EXTENDED RELEASE ORAL EVERY MORNING
Qty: 30 CAPSULE | Refills: 0 | Status: SHIPPED | OUTPATIENT
Start: 2023-10-24

## 2023-10-24 NOTE — TELEPHONE ENCOUNTER
Rx Refill Note  Requested Prescriptions     Pending Prescriptions Disp Refills    amphetamine-dextroamphetamine XR (Adderall XR) 20 MG 24 hr capsule 30 capsule 0     Sig: Take 1 capsule by mouth Every Morning    amphetamine-dextroamphetamine (Adderall) 10 MG tablet 30 tablet 0     Sig: Take 1 tablet by mouth Daily.    propranolol (INDERAL) 10 MG tablet 120 tablet 0     Sig: Take 1-2 tablets by mouth 2 (Two) Times a Day As Needed (anxiety).      Last office visit with prescribing clinician: 9/14/2023   Last telemedicine visit with prescribing clinician: Visit date not found   Next office visit with prescribing clinician: 11/28/2023                         Would you like a call back once the refill request has been completed: [] Yes [] No    If the office needs to give you a call back, can they leave a voicemail: [] Yes [] No    Michael Kearns MA  10/24/23, 13:49 EDT

## 2023-11-28 ENCOUNTER — TELEMEDICINE (OUTPATIENT)
Dept: PSYCHIATRY | Facility: CLINIC | Age: 33
End: 2023-11-28
Payer: MEDICAID

## 2023-11-28 DIAGNOSIS — F33.8 SEASONAL DEPRESSION: ICD-10-CM

## 2023-11-28 DIAGNOSIS — F41.1 GENERALIZED ANXIETY DISORDER: ICD-10-CM

## 2023-11-28 DIAGNOSIS — F90.2 ADHD (ATTENTION DEFICIT HYPERACTIVITY DISORDER), COMBINED TYPE: Primary | ICD-10-CM

## 2023-11-28 PROCEDURE — 1160F RVW MEDS BY RX/DR IN RCRD: CPT | Performed by: NURSE PRACTITIONER

## 2023-11-28 PROCEDURE — 1159F MED LIST DOCD IN RCRD: CPT | Performed by: NURSE PRACTITIONER

## 2023-11-28 PROCEDURE — 99214 OFFICE O/P EST MOD 30 MIN: CPT | Performed by: NURSE PRACTITIONER

## 2023-11-28 RX ORDER — BUPROPION HYDROCHLORIDE 150 MG/1
150 TABLET ORAL EVERY MORNING
Qty: 30 TABLET | Refills: 1 | Status: SHIPPED | OUTPATIENT
Start: 2023-11-28 | End: 2023-12-11

## 2023-11-28 RX ORDER — DEXTROAMPHETAMINE SACCHARATE, AMPHETAMINE ASPARTATE MONOHYDRATE, DEXTROAMPHETAMINE SULFATE AND AMPHETAMINE SULFATE 5; 5; 5; 5 MG/1; MG/1; MG/1; MG/1
20 CAPSULE, EXTENDED RELEASE ORAL EVERY MORNING
Qty: 30 CAPSULE | Refills: 0 | Status: SHIPPED | OUTPATIENT
Start: 2023-11-28

## 2023-11-28 RX ORDER — DEXTROAMPHETAMINE SACCHARATE, AMPHETAMINE ASPARTATE, DEXTROAMPHETAMINE SULFATE AND AMPHETAMINE SULFATE 2.5; 2.5; 2.5; 2.5 MG/1; MG/1; MG/1; MG/1
10 TABLET ORAL DAILY
Qty: 30 TABLET | Refills: 0 | Status: SHIPPED | OUTPATIENT
Start: 2023-11-28

## 2023-11-28 NOTE — PROGRESS NOTES
This provider is located at [facility], Behavioral Health, [address], [address] [facility] [address] [facility] in [place] [address], using a secure [employer] Video Visit through [employer]. Patient is being seen remotely via telehealth at their home address in [place] [address], and stated they are in a secure environment for this session. The patient's condition being diagnosed/treated is appropriate for telemedicine. The provider identified herself as well as her credentials. The patient, and/or patients guardian, consent to be seen remotely, and when consent is given they understand that the consent allows for patient identifiable information to be sent to a third party as needed. They may refuse to be seen remotely at any time. The electronic data is encrypted and password protected, and the patient and/or guardian has been advised of the potential risks to privacy not withstanding such measures.     You have chosen to receive care through a telehealth visit.  Do you consent to use a video/audio connection for your medical care today? Yes    Subjective   Marimar Shahid is a 33 y.o. female who presents today for follow up    Chief Complaint: ADHD    History of Present Illness:   History of Present Illness  Marimar Shahid presents today for medication management follow-up via MyChart video visit.  Currently taking Adderall XR 20 mg daily, Adderall IR 10 mg daily and propranolol 10 to 20 mg twice daily as needed.  Voices increase in depressive type symptoms, that she feels is more related to seasonal change.  Feels that winter is always trigger of sadness, decreased energy and decreased motivation.  Rates current depression 5/10 on a 0-10 scale with 10 being the worst.  Anxiety varies, often dependent on the day.  Sleep has improved, typically obtains between 8 to 9 hours per night.  Recently got a puppy.  Denies any SI/HI or AVH.  PHQ-9 total score: 3, ELIDA-7 total score: 6.    Previous Psych Meds: Wellbutrin SR  (not effective), Adderall IR     The following portions of the patient's history were reviewed and updated as appropriate: allergies, current medications, past family history, past medical history, past social history, past surgical history and problem list.      Past Medical History:  History reviewed. No pertinent past medical history.    Social History:  Social History     Socioeconomic History    Marital status: Single   Tobacco Use    Smoking status: Never     Passive exposure: Never    Smokeless tobacco: Never   Vaping Use    Vaping Use: Never used   Substance and Sexual Activity    Alcohol use: Yes     Alcohol/week: 4.0 standard drinks of alcohol     Types: 4 Glasses of wine per week    Drug use: Never    Sexual activity: Defer       Family History:  Family History   Problem Relation Age of Onset    Anxiety disorder Mother     Anxiety disorder Father     Depression Sister     Anxiety disorder Sister     Depression Brother     Anxiety disorder Brother        Past Surgical History:  History reviewed. No pertinent surgical history.    Problem List:  There is no problem list on file for this patient.      Allergy:   Allergies   Allergen Reactions    Wellbutrin [Bupropion] Hives        Current Medications:   Current Outpatient Medications   Medication Sig Dispense Refill    amphetamine-dextroamphetamine (Adderall) 10 MG tablet Take 1 tablet by mouth Daily. 30 tablet 0    amphetamine-dextroamphetamine XR (Adderall XR) 20 MG 24 hr capsule Take 1 capsule by mouth Every Morning 30 capsule 0    propranolol (INDERAL) 20 MG tablet Take 0.5-1 tablets by mouth 2 (Two) Times a Day As Needed (anxiety). 60 tablet 2     No current facility-administered medications for this visit.     Review of Systems   Constitutional:  Negative for activity change, appetite change, unexpected weight gain and unexpected weight loss.   Respiratory:  Negative for shortness of breath.    Cardiovascular:  Negative for chest pain and palpitations.    Psychiatric/Behavioral:  Positive for decreased concentration and dysphoric mood. Negative for sleep disturbance and suicidal ideas. The patient is nervous/anxious.      Physical Exam  Constitutional:       General: She is not in acute distress.     Appearance: Normal appearance.   Neurological:      Mental Status: She is alert.     Vitals:   The patient was seen remotely today via a MyChart Video Visit through [facility]. Unable to obtain vital signs due to nature of remote visit.    Mental Status Exam:   Hygiene:    appears good  Cooperation:  Cooperative  Eye Contact:   LAVELL r/t video visit  Psychomotor Behavior:  Appropriate  Affect:  Appropriate  Mood: normal  Hopelessness: Denies  Speech:  Normal  Thought Process:  Goal directed and Linear  Thought Content:  Mood congruent  Suicidal:  None  Homicidal:  None  Hallucinations:  None  Delusion:  None  Memory:  Intact  Orientation:  Person, Place, Time, and Situation  Reliability:  good  Insight:  Good  Judgement:  Good  Impulse Control:  Good    Lab Results:   Hospital Outpatient Visit on 06/19/2023   Component Date Value Ref Range Status    QT Interval 06/19/2023 382  ms Final    QTC Interval 06/19/2023 446  ms Final   Lab on 06/19/2023   Component Date Value Ref Range Status    THC, Screen, Urine 06/19/2023 Negative  Negative Final    Phencyclidine (PCP), Urine 06/19/2023 Negative  Negative Final    Cocaine Screen, Urine 06/19/2023 Negative  Negative Final    Methamphetamine, Ur 06/19/2023 Negative  Negative Final    Opiate Screen 06/19/2023 Negative  Negative Final    Amphetamine Screen, Urine 06/19/2023 Negative  Negative Final    Benzodiazepine Screen, Urine 06/19/2023 Negative  Negative Final    Tricyclic Antidepressants Screen 06/19/2023 Negative  Negative Final    Methadone Screen, Urine 06/19/2023 Negative  Negative Final    Barbiturates Screen, Urine 06/19/2023 Negative  Negative Final    Oxycodone Screen, Urine 06/19/2023 Negative  Negative Final     Propoxyphene Screen 06/19/2023 Negative  Negative Final    Buprenorphine, Screen, Urine 06/19/2023 Negative  Negative Final    PH 06/19/2023 7.9  4.5 - 9 Final    CREATININE 06/19/2023 16.1  20 - 300 mg/dL mg/dL Final    SPECIFIC GRAVITY 06/19/2023 1.001  1.003 - 1.035 Final    CODEINE 06/19/2023 Negative  50 ng/ml ng/ml Final    HYDROCODONE 06/19/2023 Negative  50 ng/ml ng/ml Final    NORHYDROCODONE 06/19/2023 Negative  50 ng/ml ng/ml Final    HYDROMORPHONE 06/19/2023 Negative  50 ng/ml ng/ml Final    MORPHINE 06/19/2023 Negative  50 ng/ml ng/ml Final    FENTANYL 06/19/2023 Negative  2 ng/ml ng/ml Final    NORFENTANYL 06/19/2023 Negative  10 ng/ml ng/ml Final    METHADONE 06/19/2023 Negative  25 ng/ml ng/ml Final    EDDP 06/19/2023 Negative  50 ng/ml ng/ml Final    OXYCODONE 06/19/2023 Negative  50 ng/ml ng/ml Final    NOROXYCODONE 06/19/2023 Negative  50 ng/ml ng/ml Final    OXYMORPHONE 06/19/2023 Negative  50 ng/ml ng/ml Final    TAPENTADOL 06/19/2023 Negative  50 ng/ml ng/ml Final    TRAMADOL 06/19/2023 Negative  50 ng/ml ng/ml Final    BUPRENORPHINE 06/19/2023 Negative  7.5 ng/ml ng/ml Final    NORBUPRENORPHINE 06/19/2023 Negative  10 ng/ml ng/ml Final    AMOBARBITAL 06/19/2023 Negative  100 ng/ml ng/ml Final    BUTABARBITAL 06/19/2023 Negative  100 ng/ml ng/ml Final    BUTALBITAL 06/19/2023 Negative  100 ng/ml ng/ml Final    PHENOBARBITAL 06/19/2023 Negative  100 ng/ml ng/ml Final    SECOBARBITAL 06/19/2023 Negative  100 ng/ml ng/ml Final    ALPRAZOLAM 06/19/2023 Negative  50 ng/ml ng/ml Final    ALPHA-HYDROXYALPRAZOLAM 06/19/2023 Negative  50 ng/ml ng/ml Final    CLONAZEPAM 06/19/2023 Negative  50 ng/ml ng/ml Final    7- AMINOCLONAZEPAM 06/19/2023 Negative  50 ng/ml ng/ml Final    DIAZEPAM 06/19/2023 Negative  50 ng/ml ng/ml Final    NORDIAZEPAM 06/19/2023 Negative  50 ng/ml ng/ml Final    LORAZEPAM 06/19/2023 Negative  100 ng/ml ng/ml Final    MIDAZOLAM 06/19/2023 Negative  50 ng/ml ng/ml Final     ALPHA-HYDROXYMIDAZOLAM 06/19/2023 Negative  50 ng/ml ng/ml Final    OXAZEPAM 06/19/2023 Negative  50 ng/ml ng/ml Final    TEMAZEPAM 06/19/2023 Negative  50 ng/ml ng/ml Final    ETG 06/19/2023 1162.2 (C)  200 ng/ml ng/ml Final    BENZOYLECGONINE 06/19/2023 Negative  100 ng/ml ng/ml Final    6-CHARIS 06/19/2023 Negative  10 ng/ml ng/ml Final    MDMA 06/19/2023 Negative  100 ng/ml ng/ml Final    PCP 06/19/2023 Negative  20 ng/ml ng/ml Final    DELTA-9-THC 06/19/2023 Negative  20 ng/ml ng/ml Final    AMPHETAMINE 06/19/2023 Negative  250 ng/ml ng/ml Final    METHAMPHETAMINE 06/19/2023 Negative  100 ng/ml ng/ml Final    METHYLPHENIDATE 06/19/2023 Negative  10 ng/ml ng/ml Final    PHENTERMINE 06/19/2023 Negative  100 ng/ml ng/ml Final    RITALINIC ACID 06/19/2023 Negative  50 ng/ml ng/ml Final    CARISOPRODOL 06/19/2023 Negative  100 ng/ml ng/ml Final    GABAPENTIN 06/19/2023 Negative  500 ng/ml ng/ml Final    PREGABALIN 06/19/2023 Negative  250 ng/ml ng/ml Final    ZOLPIDEM 06/19/2023 Negative  2 ng/ml ng/ml Final    CARBOXYZOLPIDEM 06/19/2023 Negative  10 ng/ml ng/ml Final       EKG Results:  No orders to display       Assessment & Plan   Problems Addressed this Visit    None  Visit Diagnoses       ADHD (attention deficit hyperactivity disorder), combined type    -  Primary    Relevant Medications    amphetamine-dextroamphetamine XR (Adderall XR) 20 MG 24 hr capsule    amphetamine-dextroamphetamine (Adderall) 10 MG tablet    Seasonal depression        Relevant Medications    amphetamine-dextroamphetamine XR (Adderall XR) 20 MG 24 hr capsule    amphetamine-dextroamphetamine (Adderall) 10 MG tablet    Generalized anxiety disorder        Relevant Medications    amphetamine-dextroamphetamine XR (Adderall XR) 20 MG 24 hr capsule    amphetamine-dextroamphetamine (Adderall) 10 MG tablet          Diagnoses         Codes Comments    ADHD (attention deficit hyperactivity disorder), combined type    -  Primary ICD-10-CM:  F90.2  ICD-9-CM: 314.01     Seasonal depression     ICD-10-CM: F33.8  ICD-9-CM: 296.99     Generalized anxiety disorder     ICD-10-CM: F41.1  ICD-9-CM: 300.02             Visit Diagnoses:    ICD-10-CM ICD-9-CM   1. ADHD (attention deficit hyperactivity disorder), combined type  F90.2 314.01   2. Seasonal depression  F33.8 296.99   3. Generalized anxiety disorder  F41.1 300.02     Marimar presents today via [employer] video visit for medication management follow-up.  Reports overall improvement in ADHD symptoms with current medication regimen.  Currently struggling with more seasonal type depression and intermittent anxiety.  Discussed medication options/regimen, agreeable to continue with Adderall XR 20 mg daily, Adderall IR 10 mg daily, propranolol 10 to 20 mg twice daily as needed and add Wellbutrin  mg daily for depression.  Denies any adverse effects of current medication regimen.    -Start Wellbutrin  mg daily  -Refill Adderall IR 10 mg daily for ADHD  -Refill Adderall XR 20 mg daily for ADHD  -Continue propranolol 10 to 20 mg twice daily as needed for anxiety    -Reviewed previous available documentation and most recent available labs. EKG obtained 6/19/2023, NSR with HR 82. UDS on file from 6/19/2023 and is appropriate. [place] reviewed per PDMP and is appropriate. Signed controlled substance agreement on file. Counseled on use of controlled substances.     GOALS:  Short Term Goals: Patient will be compliant with medication, and patient will have no significant medication related side effects.  Patient will be engaged in psychotherapy as indicated.  Patient will report subjective improvement of symptoms.  Long term goals: To stabilize mood and treat/improve subjective symptoms, the patient will stay out of the hospital, the patient will be at an optimal level of functioning, and the patient will take all medications as prescribed.  The patient/guardian verbalized understanding and agreement with goals  that were mutually set.    TREATMENT PLAN: Continue supportive psychotherapy efforts and medications as indicated for patient's diagnosis.  Pharmacological and Non-Pharmacological treatment options discussed during today's visit. Patient/Guardian acknowledged and verbally consented with current treatment plan and was educated on the importance of compliance with treatment and follow-up appointments.      MEDICATION ISSUES:  Discussed medication options and treatment plan of prescribed medication as well as the risks, benefits, any black box warnings, and side effects including potential falls, possible impaired driving, and metabolic adversities among others. Patient is agreeable to call the office with any worsening of symptoms or onset of side effects, or if any concerns or questions arise.  The contact information for the office is made available to the patient. Patient is agreeable to call 911 or go to the nearest ER should they begin having any SI/HI, or if any urgent concerns arise. No medication side effects or related complaints today.     MEDS ORDERED DURING VISIT:  New Medications Ordered This Visit   Medications    amphetamine-dextroamphetamine XR (Adderall XR) 20 MG 24 hr capsule     Sig: Take 1 capsule by mouth Every Morning     Dispense:  30 capsule     Refill:  0    amphetamine-dextroamphetamine (Adderall) 10 MG tablet     Sig: Take 1 tablet by mouth Daily.     Dispense:  30 tablet     Refill:  0       FOLLOW UP:  Return in about 8 weeks (around 1/23/2024) for Recheck, Video visit.    I spent 25 minutes caring for Marimar on this date of service. This time includes time spent by me in the following activities: preparing for the visit, obtaining and/or reviewing a separately obtained history, performing a medically appropriate examination and/or evaluation, counseling and educating the patient/family/caregiver, ordering medications, tests, or procedures and documenting information in the medical record.            This document has been electronically signed by RITCHIE Unger  December 12, 2023 22:30 EST    Please note that portions of this note were completed with a voice recognition program. Efforts were made to edit dictation, but occasionally words are mistranscribed.

## 2023-12-11 ENCOUNTER — TELEPHONE (OUTPATIENT)
Dept: PSYCHIATRY | Facility: CLINIC | Age: 33
End: 2023-12-11
Payer: MEDICAID

## 2023-12-11 NOTE — TELEPHONE ENCOUNTER
Can try a different medication. She can take OTC Benadryl as directed if hives/itching is has not improved.

## 2023-12-11 NOTE — TELEPHONE ENCOUNTER
Patient left a message that she had a reaction to Wellbutrin it caused her to have hives. Started Friday on 10 th day thinks her scalp started itching for a few days before hives. Progressively got worst hives popped up on chin, corners of mouth, on top of back feet and palms was really itchy. Like travelled all over. Was itchy Saturday, Sunday worn off today a lot better. She said it was helping and so is upset that it caused this. She said that she has stopped the medication but wanted to ask Kalie for advisement. I did list as allergy on her med list.

## 2024-01-23 ENCOUNTER — TELEMEDICINE (OUTPATIENT)
Dept: PSYCHIATRY | Facility: CLINIC | Age: 34
End: 2024-01-23
Payer: MEDICAID

## 2024-01-23 DIAGNOSIS — F41.1 GENERALIZED ANXIETY DISORDER: ICD-10-CM

## 2024-01-23 DIAGNOSIS — F90.2 ADHD (ATTENTION DEFICIT HYPERACTIVITY DISORDER), COMBINED TYPE: Primary | ICD-10-CM

## 2024-01-23 PROCEDURE — 1160F RVW MEDS BY RX/DR IN RCRD: CPT | Performed by: NURSE PRACTITIONER

## 2024-01-23 PROCEDURE — 99214 OFFICE O/P EST MOD 30 MIN: CPT | Performed by: NURSE PRACTITIONER

## 2024-01-23 PROCEDURE — 1159F MED LIST DOCD IN RCRD: CPT | Performed by: NURSE PRACTITIONER

## 2024-01-23 RX ORDER — DEXTROAMPHETAMINE SACCHARATE, AMPHETAMINE ASPARTATE, DEXTROAMPHETAMINE SULFATE AND AMPHETAMINE SULFATE 2.5; 2.5; 2.5; 2.5 MG/1; MG/1; MG/1; MG/1
10 TABLET ORAL 2 TIMES DAILY
Qty: 60 TABLET | Refills: 0 | Status: SHIPPED | OUTPATIENT
Start: 2024-01-23

## 2024-01-23 NOTE — PROGRESS NOTES
This provider is located at [facility], [facility]al [facility], [address], [address] in [place] [address], using a secure [employer] Video Visit through [employer]. Patient is being seen remotely via telehealth at their home address in [place] [address], and stated they are in a secure environment for this session. The patient's condition being diagnosed/treated is appropriate for telemedicine. The provider identified herself as well as her credentials. The patient, and/or patients guardian, consent to be seen remotely, and when consent is given they understand that the consent allows for patient identifiable information to be sent to a third party as needed. They may refuse to be seen remotely at any time. The electronic data is encrypted and password protected, and the patient and/or guardian has been advised of the potential risks to privacy not withstanding such measures.     You have chosen to receive care through a telehealth visit.  Do you consent to use a video/audio connection for your medical care today? Yes    Subjective   Marimar Shahid is a 33 y.o. female who presents today for follow up    Chief Complaint: ADHD    History of Present Illness:   History of Present Illness  Marimar Shahid presents today via MyChart video visit for medication management follow-up.  Currently taking Adderall XR 20 mg daily along with Adderall IR 10 mg daily in the afternoons.  Also utilizes propranolol 10 to 20 mg twice daily as needed for anxiety.  Was most recently prescribed Wellbutrin XL that was discontinued due to rash. Says that after approximately 10 days of being on Wellbutrin XL, she noticed her scalp, palms of hands and feet became itchy then developed facial rash. Symptoms resolved after stopping medication.  Feels that overall all anxiety and depression are well controlled, propranolol works well to control situational anxiety.  Adderall works well to control ADHD symptoms, but does  voice that the extended release Adderall is not consistent with controlling symptoms.  Says that some days after taking medication, she notices becoming more irritable and frustrated after 2 to 3 hours.  She says that there have been times when she did not remember to take Adderall XR in the mornings, so took afternoon dose of Adderall IR and felt symptoms were better controlled.  Currently sleeping well, typically obtains approximately 8 hours of sleep per night.  Says that she was able to obtain a new job shortly before [employer], doing  about 2 days/week for 4 to 5 hours/day.  Says that medication allows her to stay on a schedule and keep up with daily tasks.  Has any issues with appetite.  Denies SI/HI, CP/SOA. PHQ-9 total score: 4, ELIDA-7 total score: 6.    Previous Psych Meds: Wellbutrin SR (not effective), Wellbutrin XL (rash), Adderall IR     The following portions of the patient's history were reviewed and updated as appropriate: allergies, current medications, past family history, past medical history, past social history, past surgical history and problem list.      Past Medical History:  History reviewed. No pertinent past medical history.    Social History:  Social History     Socioeconomic History    Marital status: Single   Tobacco Use    Smoking status: Never     Passive exposure: Never    Smokeless tobacco: Never   Vaping Use    Vaping Use: Never used   Substance and Sexual Activity    Alcohol use: Yes     Alcohol/week: 4.0 standard drinks of alcohol     Types: 4 Glasses of wine per week    Drug use: Never    Sexual activity: Defer       Family History:  Family History   Problem Relation Age of Onset    Anxiety disorder Mother     Anxiety disorder Father     Depression Sister     Anxiety disorder Sister     Depression Brother     Anxiety disorder Brother        Past Surgical History:  History reviewed. No pertinent surgical history.    Problem List:  There is no problem list on file for  this patient.      Allergy:   Allergies   Allergen Reactions    Wellbutrin [Bupropion] Hives        Current Medications:   Current Outpatient Medications   Medication Sig Dispense Refill    amphetamine-dextroamphetamine (Adderall) 10 MG tablet Take 1 tablet by mouth 2 (Two) Times a Day. 60 tablet 0    propranolol (INDERAL) 20 MG tablet Take 0.5-1 tablets by mouth 2 (Two) Times a Day As Needed (anxiety). 60 tablet 2     No current facility-administered medications for this visit.     Review of Systems   Constitutional:  Negative for activity change, appetite change, unexpected weight gain and unexpected weight loss.   Respiratory:  Negative for shortness of breath.    Cardiovascular:  Negative for chest pain and palpitations.   Psychiatric/Behavioral:  Positive for decreased concentration. Negative for sleep disturbance and suicidal ideas. The patient is nervous/anxious.      Physical Exam  Constitutional:       General: She is not in acute distress.     Appearance: Normal appearance.   Neurological:      Mental Status: She is alert.     Vitals:   The patient was [facility]. Unable to obtain vital signs due to nature of remote visit.    Mental Status Exam:   Hygiene:    appears good  Cooperation:  Cooperative  Eye Contact:   LAVELL r/t video visit  Psychomotor Behavior:  Appropriate  Affect:  Appropriate  Mood: normal  Hopelessness: Denies  Speech:  Normal  Thought Process:  Goal directed and Linear  Thought Content:  Mood congruent  Suicidal:  None  Homicidal:  None  Hallucinations:  None  Delusion:  None  Memory:  Intact  Orientation:  Person, Place, Time, and Situation  Reliability:  good  Insight:  Good  Judgement:  Good  Impulse Control:  Good    Lab Results:   No visits with results within 6 Month(s) from this visit.   Latest known visit with results is:   Hospital Outpatient Visit on 06/19/2023   Component Date Value Ref Range Status    QT Interval 06/19/2023 382   ms Final    QTC Interval 06/19/2023 446  ms Final       EKG Results:  No orders to display       Assessment & Plan   Problems Addressed this Visit    None  Visit Diagnoses       ADHD (attention deficit hyperactivity disorder), combined type    -  Primary    Relevant Medications    amphetamine-dextroamphetamine (Adderall) 10 MG tablet    Generalized anxiety disorder        Relevant Medications    amphetamine-dextroamphetamine (Adderall) 10 MG tablet          Diagnoses         Codes Comments    ADHD (attention deficit hyperactivity disorder), combined type    -  Primary ICD-10-CM: F90.2  ICD-9-CM: 314.01     Generalized anxiety disorder     ICD-10-CM: F41.1  ICD-9-CM: 300.02             Visit Diagnoses:    ICD-10-CM ICD-9-CM   1. ADHD (attention deficit hyperactivity disorder), combined type  F90.2 314.01   2. Generalized anxiety disorder  F41.1 300.02     Marimar presents today for medication management follow up via [employer] video visit. Feels that ADHD symptoms are well controlled. She does report that Adderall XR does not always remain consistent in controlling symptoms.  Discussed plan of care and medication regimen, she voices interest in continuing with Adderall IR, but increasing to twice daily dosing rather than continuing with Adderall XR.  Will stop Adderall XR and increase Adderall IR from 10 mg daily to 10 mg twice daily.  Denies any adverse effects of Adderall IR.    -Increase Adderall IR from 10 mg daily to 10 mg twice daily for ADHD  -Stop Adderall XR 20 mg daily for ADHD  -Continue propranolol 10 to 20 mg twice daily as needed for anxiety    -Reviewed previous available documentation and most recent available labs. EKG obtained 6/19/2023, NSR with HR 82. UDS on file from 6/19/2023 and is appropriate. DALLAS reviewed per PDMP and is appropriate. Signed controlled substance agreement on file. Counseled on use of controlled substances.     GOALS:  Short Term Goals: Patient will be compliant with medication,  and patient will have no significant medication related side effects.  Patient will be engaged in psychotherapy as indicated.  Patient will report subjective improvement of symptoms.  Long term goals: To stabilize mood and treat/improve subjective symptoms, the patient will stay out of the hospital, the patient will be at an optimal level of functioning, and the patient will take all medications as prescribed.  The patient/guardian verbalized understanding and agreement with goals that were mutually set.    TREATMENT PLAN: Continue supportive psychotherapy efforts and medications as indicated for patient's diagnosis.  Pharmacological and Non-Pharmacological treatment options discussed during today's visit. Patient/Guardian acknowledged and verbally consented with current treatment plan and was educated on the importance of compliance with treatment and follow-up appointments.      MEDICATION ISSUES:  Discussed medication options and treatment plan of prescribed medication as well as the risks, benefits, any black box warnings, and side effects including potential falls, possible impaired driving, and metabolic adversities among others. Patient is agreeable to call the office with any worsening of symptoms or onset of side effects, or if any concerns or questions arise.  The contact information for the office is made available to the patient. Patient is agreeable to call 911 or go to the nearest ER should they begin having any SI/HI, or if any urgent concerns arise. No medication side effects or related complaints today.     MEDS ORDERED DURING VISIT:  New Medications Ordered This Visit   Medications    amphetamine-dextroamphetamine (Adderall) 10 MG tablet     Sig: Take 1 tablet by mouth 2 (Two) Times a Day.     Dispense:  60 tablet     Refill:  0       FOLLOW UP:  Return in about 8 weeks (around 3/19/2024) for Recheck, Video visit.            This document has been electronically signed by Kalie Heredia  APRN  January 23, 2024 19:34 EST    Please note that portions of this note were completed with a voice recognition program. Efforts were made to edit dictation, but occasionally words are mistranscribed.

## 2024-03-19 DIAGNOSIS — F90.2 ADHD (ATTENTION DEFICIT HYPERACTIVITY DISORDER), COMBINED TYPE: ICD-10-CM

## 2024-03-19 RX ORDER — DEXTROAMPHETAMINE SACCHARATE, AMPHETAMINE ASPARTATE, DEXTROAMPHETAMINE SULFATE AND AMPHETAMINE SULFATE 2.5; 2.5; 2.5; 2.5 MG/1; MG/1; MG/1; MG/1
10 TABLET ORAL 2 TIMES DAILY
Qty: 60 TABLET | Refills: 0 | Status: SHIPPED | OUTPATIENT
Start: 2024-03-19

## 2024-03-19 NOTE — TELEPHONE ENCOUNTER
Rx Refill Note  Requested Prescriptions     Pending Prescriptions Disp Refills    amphetamine-dextroamphetamine (Adderall) 10 MG tablet 60 tablet 0     Sig: Take 1 tablet by mouth 2 (Two) Times a Day.      Last office visit with prescribing clinician: 9/14/2023   Last telemedicine visit with prescribing clinician: 1/23/2024   Next office visit with prescribing clinician: 3/26/2024                         Would you like a call back once the refill request has been completed: [] Yes [] No    If the office needs to give you a call back, can they leave a voicemail: [] Yes [] No    Grazyna Leos CMA  03/19/24, 15:50 EDT

## 2024-03-26 ENCOUNTER — TELEMEDICINE (OUTPATIENT)
Dept: PSYCHIATRY | Facility: CLINIC | Age: 34
End: 2024-03-26
Payer: MEDICAID

## 2024-03-26 DIAGNOSIS — F90.2 ADHD (ATTENTION DEFICIT HYPERACTIVITY DISORDER), COMBINED TYPE: Primary | ICD-10-CM

## 2024-03-26 DIAGNOSIS — F41.1 GENERALIZED ANXIETY DISORDER: ICD-10-CM

## 2024-03-26 NOTE — PROGRESS NOTES
"This provider is located at [facility], Behavioral Health, [address], [address] in [place] [address], using a secure [employer] Video Visit through [employer]. Patient is being seen remotely via telehealth at their home address in [place] [address], and stated they are in a secure environment for this session. The patient's condition being diagnosed/treated is appropriate for telemedicine. The provider identified herself as well as her credentials. The patient, and/or patients guardian, consent to be seen remotely, and when consent is given they understand that the consent allows for patient identifiable information to be sent to a third party as needed. They may refuse to be seen remotely at any time. The electronic data is encrypted and password protected, and the patient and/or guardian has been advised of the potential risks to privacy not withstanding such measures.     You have chosen to receive care through a telehealth visit.  Do you consent to use a video/audio connection for your medical care today? Yes    Subjective   Marimar Shahid is a 33 y.o. female who presents today for follow up    Chief Complaint: ADHD    History of Present Illness:   History of Present Illness  Marimar Shahid presents today for medication management follow-up via MyChart video visit.  Currently taking Adderall IR 10 mg twice daily and propranolol 10 to 20 mg twice daily as needed.  Was most recently prescribed Adderall XR and felt that medication was not consistent in controlling ADHD symptoms and varied throughout the day.  Feels that changing to immediate release twice daily significantly improved symptoms.  She is able to maintain focus, concentration, initiate and complete tasks during the day. She has also been more active, losing 10 pounds and feels this is helpful with improvement in overall mood.  Denies any current bothersome symptoms of depression, admits to having \"sad days\" but " otherwise symptoms are managed well.  Anxiety fluctuates, typically situational. Had recent routine check up with labs at PCP and says al were WNL.  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 Feels that sleep is adequate. Denies any changes in appetite.  Denies any CP/SOA.  Has been monitoring BP on occasion at home and says that readings have been WNL PHQ-9 total score: 5, ELIDA-7 total score: 7.    Previous Psych Meds: Wellbutrin SR (not effective), Wellbutrin XL (rash), Adderall XR     The following portions of the patient's history were reviewed and updated as appropriate: allergies, current medications, past family history, past medical history, past social history, past surgical history and problem list.      Past Medical History:  History reviewed. No pertinent past medical history.    Social History:  Social History     Socioeconomic History    Marital status: Single   Tobacco Use    Smoking status: Never     Passive exposure: Never    Smokeless tobacco: Never   Vaping Use    Vaping status: Never Used   Substance and Sexual Activity    Alcohol use: Yes     Alcohol/week: 4.0 standard drinks of alcohol     Types: 4 Glasses of wine per week    Drug use: Never    Sexual activity: Defer       Family History:  Family History   Problem Relation Age of Onset    Anxiety disorder Mother     Anxiety disorder Father     Depression Sister     Anxiety disorder Sister     Depression Brother     Anxiety disorder Brother        Past Surgical History:  History reviewed. No pertinent surgical history.    Problem List:  There is no problem list on file for this patient.      Allergy:   Allergies   Allergen Reactions    Wellbutrin [Bupropion] Hives        Current Medications:   Current Outpatient Medications   Medication Sig Dispense Refill    amphetamine-dextroamphetamine (Adderall) 10 MG tablet Take 1 tablet by mouth 2 (Two) Times a Day. 60 tablet 0    propranolol  (INDERAL) 20 MG tablet Take 0.5-1 tablets by mouth 2 (Two) Times a Day As Needed (anxiety). 60 tablet 2     No current facility-administered medications for this visit.     Review of Systems   Constitutional:  Negative for activity change, appetite change, unexpected weight gain and unexpected weight loss.   Respiratory:  Negative for shortness of breath.    Cardiovascular:  Negative for chest pain and palpitations.   Psychiatric/Behavioral:  Positive for decreased concentration. Negative for sleep disturbance and suicidal ideas. The patient is nervous/anxious.      Physical Exam  Constitutional:       General: She is not in acute distress.     Appearance: Normal appearance.   Neurological:      Mental Status: She is alert.     Vitals:   The patient [facility] remotely today [facility]. Unable to obtain vital signs due to nature of remote visit.    Mental Status Exam:   Hygiene:    appears good  Cooperation:  Cooperative  Eye Contact:   [facility] r/t video visit  Psychomotor Behavior:  Appropriate  Affect:  Appropriate  Mood: normal  Hopelessness: Denies  Speech:  Normal  Thought Process:  Goal directed and Linear  Thought Content:  Mood congruent  Suicidal:  None  Homicidal:  None  Hallucinations:  None  Delusion:  None  Memory:  Intact  Orientation:  Person, Place, Time, and Situation  Reliability:  good  Insight:  Good  Judgement:  Good  Impulse Control:  Good    Lab Results:   No visits with results within 6 Month(s) from this visit.   Latest known visit with results is:   Hospital Outpatient Visit on 06/19/2023   Component Date Value Ref Range Status    QT Interval 06/19/2023 382  ms Final    QTC Interval 06/19/2023 446  ms Final       EKG Results:  No orders to display       Assessment & Plan   Problems Addressed this Visit    None  Visit Diagnoses       ADHD (attention deficit hyperactivity disorder), combined type    -  Primary    Generalized anxiety disorder              Diagnoses          Codes Comments    ADHD (attention deficit hyperactivity disorder), combined type    -  Primary ICD-10-CM: F90.2  ICD-9-CM: 314.01     Generalized anxiety disorder     ICD-10-CM: F41.1  ICD-9-CM: 300.02             Visit Diagnoses:    ICD-10-CM ICD-9-CM   1. ADHD (attention deficit hyperactivity disorder), combined type  F90.2 314.01   2. Generalized anxiety disorder  F41.1 300.02     Marimar presents today via [employer] video visit for medication management follow-up.  Voices that she is doing well with current medication and feels that ADHD symptoms are adequately controlled. Will continue with current medication regimen that includes Adderall IR 10 mg daily and Propranolol 10-20 mg twice daily as needed.  Denies any adverse effects of current medication regimen.    -Continue Adderall IR 10 mg twice daily for ADHD  -Continue propranolol 10 to 20 mg twice daily as needed for anxiety    -Reviewed previous available documentation and most recent available labs. EKG on file from 6/19/2023, NSR with HR 82. UDS on file from 6/19/2023 and is appropriate. DALLAS reviewed per PDMP and is appropriate. Signed controlled substance agreement on file. Counseled on use of controlled substances.     GOALS:  Short Term Goals: Patient will be compliant with medication, and patient will have no significant medication related side effects.  Patient will be engaged in psychotherapy as indicated.  Patient will report subjective improvement of symptoms.  Long term goals: To stabilize mood and treat/improve subjective symptoms, the patient will stay out of the hospital, the patient will be at an optimal level of functioning, and the patient will take all medications as prescribed.  The patient/guardian verbalized understanding and agreement with goals that were mutually set.    TREATMENT PLAN: Continue supportive psychotherapy efforts and medications as indicated for patient's diagnosis.  Pharmacological and Non-Pharmacological treatment  options discussed during today's visit. Patient/Guardian acknowledged and verbally consented with current treatment plan and was educated on the importance of compliance with treatment and follow-up appointments.      MEDICATION ISSUES:  Discussed medication options and treatment plan of prescribed medication as well as the risks, benefits, any black box warnings, and side effects including potential falls, possible impaired driving, and metabolic adversities among others. Patient is agreeable to call the office with any worsening of symptoms or onset of side effects, or if any concerns or questions arise.  The contact information for the office is made available to the patient. Patient is agreeable to call 911 or go to the nearest ER should they begin having any SI/HI, or if any urgent concerns arise. No medication side effects or related complaints today.     MEDS ORDERED DURING VISIT:  No orders of the defined types were placed in this encounter.      FOLLOW UP:  Return in about 3 months (around 6/26/2024) for Recheck, Video visit.            This document has been electronically signed by RITCHIE Unger  March 26, 2024 11:53 EDT    Please note that portions of this note were completed with a voice recognition program. Efforts were made to edit dictation, but occasionally words are mistranscribed.

## 2024-06-26 ENCOUNTER — TELEMEDICINE (OUTPATIENT)
Dept: PSYCHIATRY | Facility: CLINIC | Age: 34
End: 2024-06-26
Payer: MEDICAID

## 2024-06-26 DIAGNOSIS — F41.1 GENERALIZED ANXIETY DISORDER: ICD-10-CM

## 2024-06-26 DIAGNOSIS — F90.2 ADHD (ATTENTION DEFICIT HYPERACTIVITY DISORDER), COMBINED TYPE: Primary | ICD-10-CM

## 2024-06-26 PROCEDURE — 1159F MED LIST DOCD IN RCRD: CPT | Performed by: NURSE PRACTITIONER

## 2024-06-26 PROCEDURE — 99213 OFFICE O/P EST LOW 20 MIN: CPT | Performed by: NURSE PRACTITIONER

## 2024-06-26 PROCEDURE — 1160F RVW MEDS BY RX/DR IN RCRD: CPT | Performed by: NURSE PRACTITIONER

## 2024-06-26 RX ORDER — DEXTROAMPHETAMINE SACCHARATE, AMPHETAMINE ASPARTATE, DEXTROAMPHETAMINE SULFATE AND AMPHETAMINE SULFATE 2.5; 2.5; 2.5; 2.5 MG/1; MG/1; MG/1; MG/1
5 TABLET ORAL 2 TIMES DAILY
Qty: 30 TABLET | Refills: 0 | Status: SHIPPED | OUTPATIENT
Start: 2024-06-26 | End: 2024-07-03 | Stop reason: SDUPTHER

## 2024-06-26 NOTE — PROGRESS NOTES
"This provider is located at [facility], [facility], [address], [address] in [place] [address], using a secure [employer] Video Visit through [employer]. Patient is being seen remotely via telehealth at their home address in [place] [address], and stated they are in a secure environment for this session. The patient's condition being diagnosed/treated is appropriate for telemedicine. The provider identified herself as well as her credentials. The patient, and/or patients guardian, consent to be seen remotely, and when consent is given they understand that the consent allows for patient identifiable information to be sent to a third party as needed. They may refuse to be seen remotely at any time. The electronic data is encrypted and password protected, and the patient and/or guardian has been advised of the potential risks to privacy not withstanding such measures.     You have chosen to receive care through a telehealth visit.  Do you consent to use a video/audio connection for your medical care today? Yes    Subjective   Marimar Shahid is a 34 y.o. female who presents today for follow up    Chief Complaint: ADHD    History of Present Illness:   History of Present Illness  Marimra Shahid presents via [employer] radio visit for medication management follow-up.  Has had some life changes recently, significant other got a new job that comes with house.  Currently in the process of moving, but will be able to remain in hometown.  Stressors associated with life changes and moving have increased anxiety, resulting in feeling more nervous and anxious.  Says that depression is not as much of an issue currently, but says that symptoms of depression do \"come and go.\"  Rates overall anxiety 5-6/10, rates depression 3/10 on a 0-10 scale with 10 being the worst.  Feels that ADHD symptoms have been well controlled with Adderall, currently taking 5 mg in the morning and 5 mg in the afternoon " that has done well with managing symptoms.  Since last visit, has utilized propranolol for anxiety and feels that medication has been beneficial.  Anxiety is typically increased at night, but able to fall asleep without issues.  Wakes up during the night on occasion, obtains about 8 hours of sleep per night on average.  Denies any issues with appetite.  PHQ-9 total score: 13, ELIDA-7 total score: 5.    Previous Psych Meds: Wellbutrin SR (not effective), Wellbutrin XL (rash), Adderall XR     The following portions of the patient's history were reviewed and updated as appropriate: allergies, current medications, past family history, past medical history, past social history, past surgical history and problem list.      Past Medical History:  History reviewed. No pertinent past medical history.    Social History:  Social History     Socioeconomic History    Marital status: Single   Tobacco Use    Smoking status: Never     Passive exposure: Never    Smokeless tobacco: Never   Vaping Use    Vaping status: Never Used   Substance and Sexual Activity    Alcohol use: Yes     Alcohol/week: 4.0 standard drinks of alcohol     Types: 4 Glasses of wine per week    Drug use: Never    Sexual activity: Defer       Family History:  Family History   Problem Relation Age of Onset    Anxiety disorder Mother     Anxiety disorder Father     Depression Sister     Anxiety disorder Sister     Depression Brother     Anxiety disorder Brother        Past Surgical History:  History reviewed. No pertinent surgical history.    Problem List:  There is no problem list on file for this patient.      Allergy:   Allergies   Allergen Reactions    Wellbutrin [Bupropion] Hives        Current Medications:   Current Outpatient Medications   Medication Sig Dispense Refill    amphetamine-dextroamphetamine (Adderall) 10 MG tablet Take 0.5 tablets by mouth 2 (Two) Times a Day. 30 tablet 0    propranolol (INDERAL) 20 MG tablet Take 0.5-1 tablets by mouth 2 (Two)  Times a Day As Needed (anxiety). 60 tablet 2     No current facility-administered medications for this visit.     Review of Systems   Constitutional:  Negative for activity change, appetite change, unexpected weight gain and unexpected weight loss.   Respiratory:  Negative for shortness of breath.    Cardiovascular:  Negative for chest pain and palpitations.   Psychiatric/Behavioral:  Positive for decreased concentration and dysphoric mood. Negative for sleep disturbance and suicidal ideas. The patient is nervous/anxious.      Physical Exam  Constitutional:       General: She is not in acute distress.     Appearance: Normal appearance.   Neurological:      Mental Status: She is alert.     Vitals:   The patient was seen remotely today via a MyChart Video Visit through [facility]. Unable to obtain vital signs due to nature of remote visit.    Mental Status Exam:   Hygiene:    appears good  Cooperation:  Cooperative  Eye Contact:   [facility] r/t video visit  Psychomotor Behavior:  Appropriate  Affect:  Appropriate  Mood: normal  Hopelessness: Denies  Speech:  Normal  Thought Process:  Goal directed and Linear  Thought Content:  Mood congruent  Suicidal:  None  Homicidal:  None  Hallucinations:  None  Delusion:  None  Memory:  Intact  Orientation:  Person, Place, Time, and Situation  Reliability:  good  Insight:  Good  Judgement:  Good  Impulse Control:  Good    Lab Results:   No visits with results within 6 Month(s) from this visit.   Latest known visit with results is:   Hospital Outpatient Visit on 06/19/2023   Component Date Value Ref Range Status    QT Interval 06/19/2023 382  ms Final    QTC Interval 06/19/2023 446  ms Final       EKG Results:  No orders to display       Assessment & Plan   Problems Addressed this Visit    None  Visit Diagnoses       Generalized anxiety disorder    -  Primary    ADHD (attention deficit hyperactivity disorder), combined type              Diagnoses         Codes Comments    Generalized anxiety  disorder    -  Primary ICD-10-CM: F41.1  ICD-9-CM: 300.02     ADHD (attention deficit hyperactivity disorder), combined type     ICD-10-CM: F90.2  ICD-9-CM: 314.01             Visit Diagnoses:    ICD-10-CM ICD-9-CM   1. Generalized anxiety disorder  F41.1 300.02   2. ADHD (attention deficit hyperactivity disorder), combined type  F90.2 314.01     [facility].  Says that she has continued to do well with current medication regimen.  ADHD symptoms are well controlled with medication. Has been able to make medication last for longer period of time by taking half the dose.  Discussed plan of medication regimen, will continue with Adderall 10 mg daily (1/2 tablet in morning and 1/2 tablet in afternoon) and Propranolol 10 to 20 mg twice daily as needed.  Denies adverse effects associated with medication regimen.    -Continue Adderall IR 5 mg twice daily for ADHD  -Continue propranolol 10 to 20 mg twice daily as needed for anxiety    -Reviewed previous available documentation and most recent available labs. EKG on file from 6/19/2023, NSR with HR 82. UDS on file from 6/19/2023 and is appropriate. [place] reviewed per PDMP and is appropriate. Signed controlled substance agreement on file. Counseled on use of controlled substances.     GOALS:  Short Term Goals: Patient will be compliant with medication, and patient will have no significant medication related side effects.  Patient will be engaged in psychotherapy as indicated.  Patient will report subjective improvement of symptoms.  Long term goals: To stabilize mood and treat/improve subjective symptoms, the patient will stay out of the hospital, the patient will be at an optimal level of functioning, and the patient will take all medications as prescribed.  The patient/guardian verbalized understanding and agreement with goals that were mutually set.    TREATMENT PLAN: Continue supportive psychotherapy efforts  and medications as indicated for patient's diagnosis.  Pharmacological and Non-Pharmacological treatment options discussed during today's visit. Patient/Guardian acknowledged and verbally consented with current treatment plan and was educated on the importance of compliance with treatment and follow-up appointments.      MEDICATION ISSUES:  Discussed medication options and treatment plan of prescribed medication as well as the risks, benefits, any black box warnings, and side effects including potential falls, possible impaired driving, and metabolic adversities among others. Patient is agreeable to call the office with any worsening of symptoms or onset of side effects, or if any concerns or questions arise.  The contact information for the office is made available to the patient. Patient is agreeable to call 911 or go to the nearest ER should they begin having any SI/HI, or if any urgent concerns arise. No medication side effects or related complaints today.     MEDS ORDERED DURING VISIT:  No orders of the defined types were placed in this encounter.      FOLLOW UP:  Return in about 3 months (around 9/26/2024) for Video visit, Recheck.            This document has been electronically signed by RITCHIE Unger  July 10, 2024 22:48 EDT    Please note that portions of this note were completed with a voice recognition program. Efforts were made to edit dictation, but occasionally words are mistranscribed.

## 2024-07-03 DIAGNOSIS — F90.2 ADHD (ATTENTION DEFICIT HYPERACTIVITY DISORDER), COMBINED TYPE: ICD-10-CM

## 2024-07-03 DIAGNOSIS — F41.9 ANXIETY: ICD-10-CM

## 2024-07-03 RX ORDER — PROPRANOLOL HYDROCHLORIDE 20 MG/1
10-20 TABLET ORAL 2 TIMES DAILY PRN
Qty: 60 TABLET | Refills: 2 | Status: SHIPPED | OUTPATIENT
Start: 2024-07-03

## 2024-07-03 RX ORDER — DEXTROAMPHETAMINE SACCHARATE, AMPHETAMINE ASPARTATE, DEXTROAMPHETAMINE SULFATE AND AMPHETAMINE SULFATE 2.5; 2.5; 2.5; 2.5 MG/1; MG/1; MG/1; MG/1
5 TABLET ORAL 2 TIMES DAILY
Qty: 30 TABLET | Refills: 0 | Status: SHIPPED | OUTPATIENT
Start: 2024-07-03

## 2024-07-03 NOTE — TELEPHONE ENCOUNTER
Pt called and stated that she moved and needed her scripts sent to the [facility] instead of the [facility]. Pharmacy changed in chart.     Rx Refill Note  Requested Prescriptions     Pending Prescriptions Disp Refills    amphetamine-dextroamphetamine (Adderall) 10 MG tablet 30 tablet 0     Sig: Take 0.5 tablets by mouth 2 (Two) Times a Day.    propranolol (INDERAL) 20 MG tablet 60 tablet 2     Sig: Take 0.5-1 tablets by mouth 2 (Two) Times a Day As Needed (anxiety).      Last office visit with prescribing clinician: 9/14/2023   Last telemedicine visit with prescribing clinician: 6/26/2024   Next office visit with prescribing clinician: 9/26/2024                         Would you like a call back once the refill request has been completed: [] Yes [] No    If the office needs to give you a call back, can they leave a voicemail: [] Yes [] No    Michael Kearns MA  07/03/24, 11:26 EDT

## 2024-09-26 ENCOUNTER — TELEMEDICINE (OUTPATIENT)
Dept: PSYCHIATRY | Facility: CLINIC | Age: 34
End: 2024-09-26
Payer: MEDICAID

## 2024-09-26 DIAGNOSIS — Z3A.15 15 WEEKS GESTATION OF PREGNANCY: ICD-10-CM

## 2024-09-26 DIAGNOSIS — F90.2 ADHD (ATTENTION DEFICIT HYPERACTIVITY DISORDER), COMBINED TYPE: Primary | ICD-10-CM

## 2024-09-26 DIAGNOSIS — F41.9 ANXIETY: ICD-10-CM
